# Patient Record
Sex: MALE | Race: AMERICAN INDIAN OR ALASKA NATIVE | URBAN - METROPOLITAN AREA
[De-identification: names, ages, dates, MRNs, and addresses within clinical notes are randomized per-mention and may not be internally consistent; named-entity substitution may affect disease eponyms.]

---

## 2019-06-05 ENCOUNTER — OFFICE VISIT (OUTPATIENT)
Dept: NEUROLOGY | Age: 49
End: 2019-06-05

## 2019-06-05 VITALS
TEMPERATURE: 98.2 F | RESPIRATION RATE: 16 BRPM | OXYGEN SATURATION: 98 % | BODY MASS INDEX: 25.68 KG/M2 | WEIGHT: 130.8 LBS | HEIGHT: 60 IN | DIASTOLIC BLOOD PRESSURE: 88 MMHG | HEART RATE: 69 BPM | SYSTOLIC BLOOD PRESSURE: 140 MMHG
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
## 2019-06-05 RX ORDER — NAPROXEN 500 MG/1
500 TABLET, DELAYED RELEASE ORAL 2 TIMES DAILY WITH MEALS
COMMUNITY

## 2019-06-05 NOTE — PATIENT INSTRUCTIONS
If you choose to go to imaging center outside of [place] [address], please be sure to bring imaging report and disc to follow up appointment. If we have ordered testing for you, know that; \"NO NEWS IS GOOD NEWS! \"     It is our policy that we know longer call patients with results, nor do we  give test results over the phone. We schedule follow up appointments so that your results can be discussed in person. This allows you to address any questions you have regarding the results. If something of concern is revealed on your test, we will contact you to discuss the matter and if needed schedule a sooner follow up appointment. Additionally, results may be found by using the My Chart feature and one of our patient service representatives at the  can give you instructions on how to access this feature to utilize our electronic medical record system. Thank you for your understanding. Patient history reviewed and patient examined. 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 neurologically looks intact and could treat with over-the-counter Aleve or ibuprofen and topical application such as Goldbond or icy heat or something similar. Should do well and a knee pad may help take some of the tension and stress and contact off that joint. At this point is welcome back as needed.

## 2019-06-05 NOTE — PROGRESS NOTES
Chief Complaint   Patient presents with    New Patient    Knee Pain     right knee tingling (like hitting funny bone) started around Mother's day, works [employer].   Julian[facility] at [facility]

## 2019-06-05 NOTE — PROGRESS NOTES
Neurology Consult      Subjective:      Shanae Cosby is a 57 y.o. male comes in with the following history. 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 Seem to be associated with increased activity and would occasionally feel tight in the medial knee compartment. Otherwise had good action with the knee and was not falling and did not feel weak. His primary care issued naproxen which seemed to help but he said he had a similar improvement with over-the-counter Aleve or ibuprofen. There is no numbness or falls and is found that the knee pads do help separate his knee from the environment he works in. Instead of the crawlspace a lot of the work in the summer goes to the attic. May be this change of pace will help him feel less symptomatic. I will suggest the pump is needed but he should do well all things considered. Again he looks neurologically intact today. Current Outpatient Medications   Medication Sig Dispense Refill    naproxen EC (NAPROSYN EC) 500 mg EC tablet Take 500 mg by mouth two (2) times daily (with meals). No Known Allergies  History reviewed. No pertinent past medical history. History reviewed. No pertinent surgical history.    Social History     Socioeconomic History    Marital status: SINGLE     Spouse name: Not on file    Number of children: Not on file    Years of education: Not on file    Highest education level: Not on file   Occupational History    Occupation: [employer]   Social Needs    Financial resource strain: Not on file    Food insecurity:     Worry: Not on file     Inability: Not on file    Transportation needs:     Medical: Not on file     Non-medical: Not on file   Tobacco Use    Smoking status: Never Smoker    Smokeless tobacco: Never Used   Substance and Sexual Activity    Alcohol use: Not Currently     Frequency: Never    Drug use: Not Currently    Sexual activity: Not on file   Lifestyle    Physical activity:     Days per week: Not on file     Minutes per session: Not on file    Stress: Not on file   Relationships    Social connections:     Talks on phone: Not on file     Gets together: Not on file     Attends Congregational service: Not on file     Active member of club or organization: Not on file     Attends meetings of clubs or organizations: Not on file     Relationship status: Not on file    Intimate partner violence:     Fear of current or ex partner: Not on file     Emotionally abused: Not on file     Physically abused: Not on file     Forced sexual activity: Not on file   Other Topics Concern    Not on file   Social History Narrative    Not on file      Family History   Problem Relation Age of Onset    Pancreatic Cancer Mother     Mental Retardation Brother       Visit Vitals  /88 (BP 1 Location: Left arm, BP Patient Position: Sitting)   Pulse 69   Temp 98.2 °F (36.8 °C) (Oral)   Resp 16   Ht 5' (1.524 m)   Wt 59.3 kg (130 lb 12.8 oz)   SpO2 98%   BMI 25.55 kg/m²        Review of Systems:   A comprehensive review of systems was negative except for that written in the HPI. Neuro Exam:     Appearance: The patient is well developed, well nourished, provides a coherent history and is in no acute distress. Mental Status: Oriented to time, place and person. Mood and affect appropriate. Cranial Nerves:   Intact visual fields. Fundi are benign. RJ, EOM's full, no nystagmus, no ptosis. Facial sensation is normal. Corneal reflexes are intact. Facial movement is symmetric. Hearing is normal bilaterally. Palate is midline with normal sternocleidomastoid and trapezius muscles are normal. Tongue is midline. Motor:  5/5 strength in upper and lower proximal and distal muscles. Normal bulk and tone. No fasciculations.    Reflexes:   Deep tendon reflexes 1-2+/4 and symmetrical.   Sensory:   Normal to touch, pinprick and vibration. Gait:  Normal gait. Tremor:   No tremor noted. Cerebellar:  No cerebellar signs present. Neurovascular:  Normal heart sounds and regular rhythm, peripheral pulses intact, and no carotid bruits. Examination of right knee shows he is just slightly tender on the medial aspect of his right knee were muscles and tendons insert from his thigh apparatus. Assessment:   Overuse injury. 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 Perhaps wearing kneepads could put some separation between him and the surfaces he works with. Also over-the-counter Aleve or ibuprofen may help as well as application of icy heat or Goldbond etc. looks neurologically intact so I do not think any additional testing from my standpoint is warranted. Knows where to find me for future reference. Plan:   Revisit as needed.   Signed by :  Sintia Forbes MD